# Patient Record
Sex: MALE | Race: WHITE | NOT HISPANIC OR LATINO | URBAN - METROPOLITAN AREA
[De-identification: names, ages, dates, MRNs, and addresses within clinical notes are randomized per-mention and may not be internally consistent; named-entity substitution may affect disease eponyms.]

---

## 2018-08-30 ENCOUNTER — HOSPITAL ENCOUNTER (EMERGENCY)
Facility: HOSPITAL | Age: 23
Discharge: HOME | End: 2018-08-30
Attending: FAMILY MEDICINE
Payer: COMMERCIAL

## 2018-08-30 VITALS
DIASTOLIC BLOOD PRESSURE: 89 MMHG | HEIGHT: 72 IN | TEMPERATURE: 99 F | WEIGHT: 250 LBS | RESPIRATION RATE: 18 BRPM | BODY MASS INDEX: 33.86 KG/M2 | HEART RATE: 75 BPM | SYSTOLIC BLOOD PRESSURE: 142 MMHG | OXYGEN SATURATION: 97 %

## 2018-08-30 DIAGNOSIS — T78.40XA ALLERGIC REACTION, INITIAL ENCOUNTER: Primary | ICD-10-CM

## 2018-08-30 PROCEDURE — 99282 EMERGENCY DEPT VISIT SF MDM: CPT

## 2018-08-31 NOTE — ED NOTES
Pt sitting upright, respirations equal and unlabored. Plan of care discussed. No needs voiced. Will continue to monitor.

## 2018-08-31 NOTE — ED PROVIDER NOTES
Encounter Date: 8/30/2018       History     Chief Complaint   Patient presents with    Insect Bite     22-year-old male presents with a itchy rash to both ankles especially the right dorsum of the foot he was running in tall grass trying to catch is dog's with bare feet he was not sure if he was just exposed to something that caused him to break out or if he was actually bitten but the problem is bilateral          Review of patient's allergies indicates:  No Known Allergies  History reviewed. No pertinent past medical history.  Past Surgical History:   Procedure Laterality Date    KNEE CARTILAGE SURGERY       History reviewed. No pertinent family history.  Social History     Tobacco Use    Smoking status: Never Smoker    Smokeless tobacco: Never Used   Substance Use Topics    Alcohol use: Yes     Alcohol/week: 3.6 oz     Types: 6 Cans of beer per week    Drug use: No     Review of Systems   Constitutional: Negative for fever.   HENT: Negative for sore throat.    Respiratory: Negative for shortness of breath.    Cardiovascular: Negative for chest pain.   Gastrointestinal: Negative for nausea.   Genitourinary: Negative for dysuria.   Musculoskeletal: Negative for back pain.   Skin: Negative for rash.   Neurological: Negative for weakness.   Hematological: Does not bruise/bleed easily.       Physical Exam     Initial Vitals [08/30/18 2144]   BP Pulse Resp Temp SpO2   (!) 142/89 75 18 98.7 °F (37.1 °C) 97 %      MAP       --         Physical Exam    Nursing note and vitals reviewed.  Constitutional: He appears well-developed and well-nourished. He is not diaphoretic. No distress.   HENT:   Head: Normocephalic and atraumatic.   Right Ear: External ear normal.   Left Ear: External ear normal.   Nose: Nose normal.   Mouth/Throat: Oropharynx is clear and moist. No oropharyngeal exudate.   Eyes: EOM are normal.   Neck: Normal range of motion. Neck supple. No tracheal deviation present.   Cardiovascular: Normal rate  and regular rhythm.   No murmur heard.  Pulmonary/Chest: Breath sounds normal. No stridor. No respiratory distress. He has no rales.   Abdominal: Soft. He exhibits no distension and no mass. There is no tenderness. There is no rebound.   Musculoskeletal: Normal range of motion. He exhibits no edema.   Lymphadenopathy:     He has no cervical adenopathy.   Neurological: He is alert and oriented to person, place, and time. He has normal strength.   Skin: Skin is warm and dry. Capillary refill takes less than 2 seconds. Rash noted. No pallor.   Urticarial rash on the right ankle some minimal involvement on the left ankle there is no evidence of an actual insect or spider bite   Psychiatric: He has a normal mood and affect.         ED Course   Procedures  Labs Reviewed - No data to display       Imaging Results    None                               Clinical Impression:   The encounter diagnosis was Allergic reaction, initial encounter.                             Andrea Jacobo MD  08/31/18 0415

## 2019-06-25 ENCOUNTER — TELEPHONE (OUTPATIENT)
Dept: SURGERY | Facility: CLINIC | Age: 24
End: 2019-06-25

## 2019-06-25 NOTE — TELEPHONE ENCOUNTER
Phoned pt regarding scheduling an appointment. Pt says he has a cyst on his back that has been there for a while, and he also needs his annual check up.  Advised pt that he would need to see a primary care provider for an annual checkup.  Pt states he would like to establish care with a PCP before scheduling with a general surgeon at this time.  He states that he will call back for an appointment.

## 2019-06-25 NOTE — TELEPHONE ENCOUNTER
----- Message from Teresita Paniagua sent at 6/25/2019 12:26 PM CDT -----  Contact: pt  Calling in regards to a missed call and appointment and please advise [phone] (home)

## 2019-06-25 NOTE — TELEPHONE ENCOUNTER
----- Message from Rachel Valdes sent at 6/25/2019 11:24 AM CDT -----  Contact: patient  Type:  Sooner Apoointment Request    Caller is requesting a sooner appointment.  Caller declined first available appointment listed below.  Caller will not accept being placed on the waitlist and is requesting a message be sent to doctor.    Name of Caller:  Wife  When is the first available appointment?  7/29  Symptoms:  Cyst on back  Best Call Back Number:  [phone] (home)     Additional Information:  n/a

## 2025-07-18 ENCOUNTER — OFFICE VISIT (OUTPATIENT)
Dept: URGENT CARE | Facility: CLINIC | Age: 30
End: 2025-07-18
Payer: OTHER GOVERNMENT

## 2025-07-18 VITALS
HEIGHT: 72 IN | DIASTOLIC BLOOD PRESSURE: 65 MMHG | BODY MASS INDEX: 35.89 KG/M2 | HEART RATE: 75 BPM | TEMPERATURE: 98 F | SYSTOLIC BLOOD PRESSURE: 139 MMHG | RESPIRATION RATE: 19 BRPM | OXYGEN SATURATION: 98 % | WEIGHT: 265 LBS

## 2025-07-18 DIAGNOSIS — L05.91 PILONIDAL CYST: Primary | ICD-10-CM

## 2025-07-18 PROCEDURE — 99215 OFFICE O/P EST HI 40 MIN: CPT | Mod: S$GLB,,, | Performed by: NURSE PRACTITIONER

## 2025-07-18 RX ORDER — DOXYCYCLINE 100 MG/1
100 CAPSULE ORAL EVERY 12 HOURS
Qty: 20 CAPSULE | Refills: 0 | Status: SHIPPED | OUTPATIENT
Start: 2025-07-18 | End: 2025-07-28

## 2025-07-18 RX ORDER — TESTOSTERONE CYPIONATE 200 MG/ML
INJECTION, SOLUTION INTRAMUSCULAR
COMMUNITY
Start: 2025-05-21

## 2025-07-18 NOTE — PROGRESS NOTES
Subjective:      Patient ID: Donovan Fofana is a 29 y.o. male.    Vitals:  height is 6' (1.829 m) and weight is 120.2 kg (265 lb). His oral temperature is 98.2 °F (36.8 °C). His blood pressure is 139/65 and his pulse is 75. His respiration is 19 and oxygen saturation is 98%.     Chief Complaint: Cyst    29-year-old afebrile male who presents today with chief complaint of recurrent cyst to gluteal cleft.  He denies any other symptoms or complaints.    Cyst  This is a recurrent problem. The current episode started in the past 7 days. The problem occurs constantly. The problem has been gradually worsening. Exacerbated by: palpation. Treatments tried: topical antibiotic ointment & rubbing alcohol. The treatment provided no relief.       Skin:  Negative for erythema.        Cyst to gluteal cleft.      Objective:     Physical Exam   Constitutional:  Non-toxic appearance. He does not appear ill. No distress. obesity  HENT:   Head: Normocephalic and atraumatic.   Mouth/Throat: Mucous membranes are moist. Oropharynx is clear.   Eyes: Conjunctivae are normal. Extraocular movement intact   Neck: Neck supple.   Cardiovascular: Normal rate, regular rhythm, normal heart sounds and normal pulses.   Pulmonary/Chest: Effort normal and breath sounds normal.   Abdominal: Normal appearance.   Musculoskeletal: Normal range of motion.         General: Normal range of motion.   Neurological: He is alert.   Skin: Skin is warm, dry, not diaphoretic, not pale and no rash. No bruising, No erythema and No lesion         Comments: There is very small pilonidal cyst to the gluteal cleft.  The area is tender to palpation.  There is no erythema or drainage from this area. no jaundice  Psychiatric: His behavior is normal.   Vitals reviewed.      Assessment:     1. Pilonidal cyst        Plan:       Pilonidal cyst  -     doxycycline (VIBRAMYCIN) 100 MG Cap; Take 1 capsule (100 mg total) by mouth every 12 (twelve) hours. for 10 days  Dispense: 20 capsule;  Refill: 0  -     Ambulatory referral/consult to General Surgery      INSTRUCTIONS  Medication as prescribed.  Increase oral fluids.  Follow up with General surgery as scheduled.  In the meantime, return here or go to ER for worsening of symptoms, or for any new symptoms as discussed.